# Patient Record
(demographics unavailable — no encounter records)

---

## 2025-01-10 NOTE — ASSESSMENT
[FreeTextEntry1] : - 1/9/2025 38-year-old gentleman with multiple medical issues, with increased levels of stress and anxiety.  From an ENT perspective patient has had concern for a 3-month history of left-sided facial sensation consistent with pins-and-needles in the V2 region.  Examination is unremarkable as a relates to this.  I am recommending consultation with neurology and will help get this expedited.  Would need imaging, orbits face neck if not already completed.    On exam there is evidence of a white coating on the tongue consistent with thrush, could be related to recent antibiotic and steroids given by urgent care.  I am recommending clotrimazole approaches and follow-up in 2 to 3 weeks for repeat evaluation.  In addition on laryngoscopy performed due to symptoms of dysphagia there is evidence of some LPR as well as mild pooling of secretions in the piriform.  I am recommending a modified barium swallow for further evaluation of swallowing.  I recommended continuing with antireflux medications as well.  Of note patient with significant stress and anxiety as it relates to his new medical symptoms.  I do believe that there is a component that this could be conversion.  Strongly recommend consultation with a therapist or psychiatrist for an urgent evaluation as he may benefit from treatment and medication.  Additionally patient has been going to emergency room's and urgent cares without any consolidated plan or medical management.  I am recommending establishing care with a primary care physician to be the point person for his medical management going forward.  - Clotrimazole troches - Low acid diet - Modified barium swallow/esophageal follow-through - Consultation with neurology - Consultation with therapy/psychiatry - Establish care with primary care physician

## 2025-01-10 NOTE — PROCEDURE
Scheduled date of EGD(as of today): 9/24/24  Physician performing EGD: TAI  Location of EGD: [facility]  Instructions reviewed with patient by: PARAG  Clearances: N  
[de-identified] : - Pre-operative Diagnosis:  Throat discomfort Post-operative Diagnosis:  + postcricoid edema, erythema vocal process, mild pooling of secretions in the piriform sinus Anesthesia: Topical - 1 % Lidocaine/Phenylephrine Procedure:  Flexible Laryngoscopy   Procedure Details:   The patient was placed in the sitting position.  After decongestant and anesthesia were applied the laryngoscope was passed.  The nasal cavities, nasopharynx, oropharynx, hypopharynx, and larynx were all examined.  Vocal folds were examined during respiration and phonation.  The following findings were noted:  Findings:   Nose: Septum is midline, turbinates are normal, nasal airways patent, mucosa normal Nasopharynx: Adenoids normal, no masses, eustachian tube normal Oropharynx: Pharyngeal walls symmetric and without lesion. Tonsils/fossae symmetric Hypopharynx: Hypopharynx and pyriform sinuses without lesion. No masses or asymmetry.  + Minimal pooling of secretions. Larynx:  Epiglottis and aryepiglottic folds were sharp and crisp bilaterally.  Bilateral false and true vocal folds normal appearance. Bilateral vocal folds fully mobile and symmetric.  Airway was widely patent. + postcricoid edema, erythema vocal process  Condition: Stable.  Patient tolerated procedure well.  Complications: None 
[de-identified] : - Pre-operative Diagnosis:  Throat discomfort Post-operative Diagnosis:  + postcricoid edema, erythema vocal process, mild pooling of secretions in the piriform sinus Anesthesia: Topical - 1 % Lidocaine/Phenylephrine Procedure:  Flexible Laryngoscopy   Procedure Details:   The patient was placed in the sitting position.  After decongestant and anesthesia were applied the laryngoscope was passed.  The nasal cavities, nasopharynx, oropharynx, hypopharynx, and larynx were all examined.  Vocal folds were examined during respiration and phonation.  The following findings were noted:  Findings:   Nose: Septum is midline, turbinates are normal, nasal airways patent, mucosa normal Nasopharynx: Adenoids normal, no masses, eustachian tube normal Oropharynx: Pharyngeal walls symmetric and without lesion. Tonsils/fossae symmetric Hypopharynx: Hypopharynx and pyriform sinuses without lesion. No masses or asymmetry.  + Minimal pooling of secretions. Larynx:  Epiglottis and aryepiglottic folds were sharp and crisp bilaterally.  Bilateral false and true vocal folds normal appearance. Bilateral vocal folds fully mobile and symmetric.  Airway was widely patent. + postcricoid edema, erythema vocal process  Condition: Stable.  Patient tolerated procedure well.  Complications: None

## 2025-01-10 NOTE — PHYSICAL EXAM
[Normal] : mucosa is normal [Midline] : trachea located in midline position [de-identified] : White coating on tongue

## 2025-01-10 NOTE — HISTORY OF PRESENT ILLNESS
[de-identified] : - 1/9/25 38y male who present with multiple medical issues.  He notes that his symptoms began over 3 months ago in October.  At that time he was having stomach pains as well as severe nausea.  He was having epigastric discomfort.  Subsequently he started to note a "pins-and-needles" sensation in his left face.  It is a tingling sensation but not painful.  He notes that sometimes he can be burning or swollen like getting "hit in the face."  He notes that it might be able to go away when focused elsewhere, but when he thinks about it, it returns.  Feels that it is affecting gums and teeth as well.  Sees dentist regularly.    He went to urgent care and dx with gastritis in October.  Had a CT abdomen which was negative.  Given pantoprazole but nausea and abdominal pain continued.  Subsequently when to [facility] ED and again at CT and US.  Liver lesion was noted.  Had an MRI and found to be benign (seen at [facility]).  Subsequently seen at [facility] emergency room after no changes in symptoms.  Recently at Urgent Care - Augmentin, Medrol daniel.  Around [facility], started to have eye issues.  Seen by ophthalmology and and was cleared with essentially normal exam and given artificial tears.  He does have an unusual medical history.  As a child had elevated CPK which has been looked at several times and is essentially been negative.  He was told that he has Lyme disease after tick bite as a child as well.  There is also this questionable pituitary tumor in the past.  All of which has been moderate and normal.  The patient has been in general good health until 3 to 4 months ago when the most recent symptoms have started.    *Should be noted when I first walked into the room the patient states "I think I am having a panic attack".  Patient is visibly distraught with increased evidence of anxiety.

## 2025-01-10 NOTE — HISTORY OF PRESENT ILLNESS
[de-identified] : - 1/9/25 38y male who present with multiple medical issues.  He notes that his symptoms began over 3 months ago in October.  At that time he was having stomach pains as well as severe nausea.  He was having epigastric discomfort.  Subsequently he started to note a "pins-and-needles" sensation in his left face.  It is a tingling sensation but not painful.  He notes that sometimes he can be burning or swollen like getting "hit in the face."  He notes that it might be able to go away when focused elsewhere, but when he thinks about it, it returns.  Feels that it is affecting gums and teeth as well.  Sees dentist regularly.    He went to urgent care and dx with gastritis in October.  Had a CT abdomen which was negative.  Given pantoprazole but nausea and abdominal pain continued.  Subsequently when to [facility] ED and again at CT and US.  Liver lesion was noted.  Had an MRI and found to be benign (seen at [facility]).  Subsequently seen at [facility] emergency room after no changes in symptoms.  Recently at Urgent Care - Augmentin, Medrol daniel.  Around [facility], started to have eye issues.  Seen by ophthalmology and and was cleared with essentially normal exam and given artificial tears.  He does have an unusual medical history.  As a child had elevated CPK which has been looked at several times and is essentially been negative.  He was told that he has Lyme disease after tick bite as a child as well.  There is also this questionable pituitary tumor in the past.  All of which has been moderate and normal.  The patient has been in general good health until 3 to 4 months ago when the most recent symptoms have started.    *Should be noted when I first walked into the room the patient states "I think I am having a panic attack".  Patient is visibly distraught with increased evidence of anxiety.

## 2025-01-10 NOTE — PHYSICAL EXAM
[Normal] : mucosa is normal [Midline] : trachea located in midline position [de-identified] : White coating on tongue

## 2025-01-10 NOTE — REASON FOR VISIT
[Initial Consultation] : an initial consultation for [FreeTextEntry2] : Left-sided facial discomfort
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
## 2025-01-23 NOTE — PROCEDURE
[de-identified] : - Pre-operative Diagnosis:  Throat discomfort Post-operative Diagnosis:  + postcricoid edema, erythema vocal process, pooling of secretions appears improved compared with previous  Anesthesia: Topical - 1 % Lidocaine/Phenylephrine Procedure:  Flexible Laryngoscopy   Procedure Details:   The patient was placed in the sitting position.  After decongestant and anesthesia were applied the laryngoscope was passed.  The nasal cavities, nasopharynx, oropharynx, hypopharynx, and larynx were all examined.  Vocal folds were examined during respiration and phonation.  The following findings were noted:  Findings:   Nose: Septum is midline, turbinates are normal, nasal airways patent, mucosa normal Nasopharynx: Adenoids normal, no masses, eustachian tube normal Oropharynx: Pharyngeal walls symmetric and without lesion. Tonsils/fossae symmetric Hypopharynx: Hypopharynx and pyriform sinuses without lesion. No masses or asymmetry.  pooling of secretions appears improved compared with previous  Larynx:  Epiglottis and aryepiglottic folds were sharp and crisp bilaterally.  Bilateral false and true vocal folds normal appearance. Bilateral vocal folds fully mobile and symmetric.  Airway was widely patent. + postcricoid edema, erythema vocal process  Condition: Stable.  Patient tolerated procedure well.  Complications: None
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
## 2025-01-23 NOTE — PHYSICAL EXAM
[FreeTextEntry1] : General: this is a pleasant patient in no acute distress   HEENT conjunctiva are normal, no tenderness in head.  CV: normal pulses, regular rate and rhythm, no peripheral edema noted Lungs: breathing is non-labored abd: soft and non-distended   MSK: Full neck ROM. No occipital or temple tenderness. bilateral +jaw click with jaw clench    Mental status: Alert and oriented to person, place and time, normal speech and comprehension   Cranial Nerves: extra-occular movements in tact without nystagmus, normal saccades and smooth pursuit, Face symmetric and facial strength symmetric, facial sensation symmetric to LT pinprick and cold   Motor: normal bulk and tone throughout. no abnormal movements.  Full 5/5 strength uppers and lower extremities proximally and distally   Sensory: in tact and symmetric to vibration, light tough, temperature   Cerebellar: normal finger-nose-finger bilaterally   Reflexes: 2+ in the upper and lower extremities and symmetric.  toes are bilaterally downgoing. Gait: stable
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
## 2025-01-23 NOTE — PHYSICAL EXAM
[Normal] : mucosa is normal [Midline] : trachea located in midline position [de-identified] : white coating of tongue apepars improved

## 2025-01-23 NOTE — REASON FOR VISIT
[Subsequent Evaluation] : a subsequent evaluation for [FreeTextEntry2] : Left-sided facial discomfort

## 2025-01-23 NOTE — CONSULT LETTER
[Dear  ___] : Dear  [unfilled], [Courtesy Letter:] : I had the pleasure of seeing your patient, [unfilled], in my office today. [Please see my note below.] : Please see my note below. [Consult Closing:] : Thank you very much for allowing me to participate in the care of this patient.  If you have any questions, please do not hesitate to contact me. [Sincerely,] : Sincerely, [FreeTextEntry3] : Julian Hunter MD

## 2025-02-12 NOTE — DATA REVIEWED
[de-identified] : 1/27/25 Audio: Hearing WNL AD and hearing WNL sloping to a moderate to severe HF SNHL AS Tymp: Type A tympanograms AU and Complete Audio Evaluation. [de-identified] : MRI Brain & IAC (2/3/25) 1.  No acute intracranial abnormality. No findings suspicious for demyelinating disease. 2.  Negative CPA/IAC bilaterally. 3.  Very mild sinus disease.
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
## 2025-02-12 NOTE — PHYSICAL EXAM
[Normal] : mucosa is normal [Midline] : trachea located in midline position [de-identified] : white coating of tongue apepars improved

## 2025-02-12 NOTE — REASON FOR VISIT
[Subsequent Evaluation] : a subsequent evaluation for [FreeTextEntry2] : Left-sided facial discomfort, left hearing loss, left tinnitus

## 2025-02-27 NOTE — PHYSICAL EXAM
[Respiratory Effort] : normal respiratory effort [Alert] : alert [Oriented to Person] : oriented to person [Oriented to Place] : oriented to place [Calm] : calm [de-identified] : NAD [de-identified] : nml [de-identified] : multiple small lipomas on the abdominal wall - about 3 cm - deep. same on his back.  [de-identified] : see above

## 2025-02-27 NOTE — PLAN
[FreeTextEntry1] : Surgery in OR for excision of at least 6 or so of them.  All for pathology given hx of sarcoma in the family.

## 2025-02-27 NOTE — HISTORY OF PRESENT ILLNESS
[de-identified] : Patient w history of multiple small lipomas excised in the past and he continually gets more.  His last surgery was about 10 yrs ago - though recently he has had some health issues w neurology and ENT due to a numbness and vision change in his right side of his face.  This connected him w [facility] doctors and subsequently w me.  He states his mom has FML and his dad has a hx of sarcoma from a lesion on his back - so he always worries these lipomas could be cancer.  He lives at home w his children (ages 6,4,2) and his wife who is a nurse.  He works as a  but is often in harms way in the line of duty.

## 2025-03-15 NOTE — HISTORY OF PRESENT ILLNESS
[FreeTextEntry1] : NP - moles [de-identified] : Gurjit Chaves 39y/o M presents for check for moles.  Few he is curious about but none new, growing, or changing  PHx of skin cancer: no FHx of skin cancer: Father (?BCC, ?SCC), Aunt (BCC), Grandfather (BCC and SCC) H/x of blistering sunburns: yes H/x of tanning bed use: no Uses sunscreen regularly: no  SH:

## 2025-03-15 NOTE — HISTORY OF PRESENT ILLNESS
[FreeTextEntry1] : NP - moles [de-identified] : Gurjit Chaves 37y/o M presents for check for moles.  Few he is curious about but none new, growing, or changing  PHx of skin cancer: no FHx of skin cancer: Father (?BCC, ?SCC), Aunt (BCC), Grandfather (BCC and SCC) H/x of blistering sunburns: yes H/x of tanning bed use: no Uses sunscreen regularly: no  SH:

## 2025-03-15 NOTE — PHYSICAL EXAM
[FreeTextEntry3] :  AAOx3, NAD, well-appearing / pleasant Total body skin exam performed within normal limits with the exception of:  - Patient deferred examination of genitalia  Left upper arm pink brown papule Right buttock asymmetric brown patch Fairly uniform and regular brown macules and papules on the trunk and extremities

## 2025-03-15 NOTE — ASSESSMENT
[FreeTextEntry1] : # Neoplasm of uncertain behavior Location: L upper arm DDx: R/o scar over nevus vs atypical melanocytic proliferation  Biopsy by shave The risks/benefits/alternatives of skin biopsy were explained to the patient, which include and are not limited to bleeding, infection, scarring or discoloration of skin, and recurrence of lesion. Patient expressed understanding of these risks and provided consent to the procedure. Time out with verification of patient and lesion site was performed. Site was prepped with rubbing alcohol, lidocaine with epinephrine was injected for anesthesia, and biopsy was performed. Specimen sent to path.  Procedure was without complication and well tolerated. Wound care was discussed.  # Neoplasm of uncertain behavior Location: R buttock DDx: CALM vs vs atypical melanocytic proliferation Biopsy by 4 mm punch  The risks/benefits/alternatives of skin biopsy were explained to the patient, which include and are not limited to bleeding, infection, scarring or discoloration of skin, and recurrence of lesion. Patient expressed understanding of these risks and provided consent to the procedure. Time out with verification of patient and lesion site was performed. Site was prepped with rubbing alcohol, lidocaine with epinephrine was injected for anesthesia, and biopsy was performed. Specimen sent to path.  Procedure was without complication and well tolerated. Wound care was discussed.  Dissolvable sutures used  #Lentigines # Multiple melanocytic nevi, all benign appearing on today's exam aside from biopsies above -Sun protection was discussed. The proper use of broad spectrum UVB/UVA sunscreen with SPF 30 or greater was reviewed and the need for re-application after swimming or sweating or 2-3 hours was emphasized. We discussed judicious use of clothing and avoidance of peak periods of sun exposure. I made the patient aware of need for year-round protection. ABCDEs of melanoma reviewed. -Self-skin check also reviewed -Counseled pt to monitor for changes   # Screening for skin cancer -ABCDEs of melanoma, sun safety, and self-skin check reviewed -Counseled pt to notify us of any changing or concerning lesions -RTC 12 months, sooner prn

## 2025-04-01 NOTE — HISTORY OF PRESENT ILLNESS
[FreeTextEntry1] : 38-year-old male here for evaluation of hypothyroidism   He reported that he was diagnosed at age 16.  On Levothyroxine since then.  On LT4 200 mcg daily   Symptoms:  -Low energy  -No hair loss  -Normal bowel movements  -No HP or tremors  -Decreased concentration  -Lost 20 lbs in the past year (intentional)  -No cold or heat intolerance     10/2024- he had severe abdominal pain, found to have a liver lesion.  He had endoscopy had negative biopsies   Got diagnosed with hemochromatosis ( Ferrtin was 1400- Hgb of 18)  Every week phlebotomy   HgA1C of 6.3%  Lost 20 lbs in the past year   Optho: 3-4 months ago ( 12/2024)  Neuro: Occasional in the arms but has herniated discs   Breakfast: Ship  Lunch: Mediterranean bowl ( chicken, rice)  Dinner: Pizza, wrap, burgers  Snacks; None  Juice or soda: Ice tea with starbucks  Desserts: Once every two weeks, cake or cookies

## 2025-04-01 NOTE — PHYSICAL EXAM
[Alert] : alert [Well Nourished] : well nourished [No Acute Distress] : no acute distress [Normal Sclera/Conjunctiva] : normal sclera/conjunctiva [EOMI] : extra ocular movement intact [PERRL] : pupils equal, round and reactive to light [Normal Outer Ear/Nose] : the ears and nose were normal in appearance [Normal Hearing] : hearing was normal [Normal TMs] : both tympanic membranes were normal [No Neck Mass] : no neck mass was observed [Thyroid Not Enlarged] : the thyroid was not enlarged [No Respiratory Distress] : no respiratory distress [Clear to Auscultation] : lungs were clear to auscultation bilaterally [Normal S1, S2] : normal S1 and S2 [Normal Rate] : heart rate was normal [Regular Rhythm] : with a regular rhythm [Normal Bowel Sounds] : normal bowel sounds [Not Tender] : non-tender [Soft] : abdomen soft [Normal Gait] : normal gait [No Clubbing, Cyanosis] : no clubbing  or cyanosis of the fingernails [No Joint Swelling] : no joint swelling seen [No Rash] : no rash [No Skin Lesions] : no skin lesions [No Motor Deficits] : the motor exam was normal [No Tremors] : no tremors [Oriented x3] : oriented to person, place, and time [Normal Affect] : the affect was normal [Normal Insight/Judgement] : insight and judgment were intact

## 2025-04-01 NOTE — ASSESSMENT
[FreeTextEntry1] : 38 year old male here for evaluation of hypothyroidism and prediabetes.  Hypothyroidism:  -check TFTs  -Continue Levothyroxine 200 mcg daily  -Clinically euthyroid   Prediabetes:  -Check HgA1C, lipid panel and BMP  -Extensive discussion about weight loss methods  -Discussed calorie restriction    Follow up in 3 moths

## 2025-04-01 NOTE — REVIEW OF SYSTEMS
[Fatigue] : no fatigue [Decreased Appetite] : appetite not decreased [Recent Weight Loss (___ Lbs)] : no recent weight loss [Visual Field Defect] : no visual field defect [Dry Eyes] : no dryness [Dysphagia] : no dysphagia [Dysphonia] : no dysphonia [Chest Pain] : no chest pain [Slow Heart Rate] : heart rate is not slow [Palpitations] : no palpitations [Fast Heart Rate] : heart rate is not fast [Shortness Of Breath] : no shortness of breath [Cough] : no cough [Nausea] : no nausea [Constipation] : no constipation [Vomiting] : no vomiting [Polyuria] : no polyuria [Hesistancy] : no hesitancy [Joint Pain] : no joint pain [Muscle Weakness] : no muscle weakness [Acanthosis] : no acanthosis  [Acne] : no acne [Headaches] : no headaches [Tremors] : no tremors [Depression] : no depression [Polydipsia] : no polydipsia [Cold Intolerance] : no cold intolerance [Easy Bleeding] : no ~M tendency for easy bleeding [Easy Bruising] : no tendency for easy bruising

## 2025-04-18 NOTE — HISTORY OF PRESENT ILLNESS
[de-identified] : 38 yr old s/p removal of mulitple angiolipomas on his anterior abdominal wall.  Doing well.  No issues.  he did have some hot grease splatter on his shirt near the incisions recently while searing a steak.  He got a mild burn but it is healing well.

## 2025-04-18 NOTE — ASSESSMENT
[FreeTextEntry1] : BAcitracin or Neosporin to burn sites and the RUQ incision.  Follow up as needed for the remainder of the lipomas on your flank and back.

## 2025-04-18 NOTE — PHYSICAL EXAM
[Calm] : calm [de-identified] : NAD [de-identified] : all incisions healed. RUQ incisions wtih some erythema

## 2025-05-13 NOTE — PHYSICAL EXAM
[de-identified] : Constitutional: Well developed, well nourished, able to communicate Neuro: Normal sensation, No focal deficits Skin: Intact CV: Peripheral vascular exam grossly normal Pulm: No signs of respiratory distress Psych: Oriented, normal mood and affect  L knee: - No obvious deformity or swelling - No pain with palpation of medial or lateral joint line. - ROM from 135 degrees of flexion to 0 degrees extension. - 5/5 strength with knee flexion and extension - Distally neurovascularly intact.     R knee: - No obvious deformity or swelling - No pain with palpation of medial or lateral joint line. - ROM from 135 degrees of flexion to 0 degrees extension. - 5/5 strength with knee flexion and extension - Stable varus, valgus testing -Negative Homans - Pain with gastroc greater than soleus stretching - Distally neurovascularly intact. [de-identified] : 3 views XR right knee without fracture, dislocation, significant degenerative change.  Small amount of patellofemoral osteophyte changes with lateral patellar tilt

## 2025-05-13 NOTE — ASSESSMENT
[FreeTextEntry1] : Right knee pain with fullness sensation in posterior popliteal fossa.  Ultrasound for DVT at [facility] showing moderate size Baker's cyst.  Ultrasound evaluation in clinic today with no evidence of ongoing Baker's cyst.  X-rays obtained showing very mild patellofemoral degenerative changes.  Exam more specific for pain related to gastroc activation - Recommend home exercise plan versus physical therapy for his gastrocnemius - He can use compression sleeve if he feels like this helps - Follow-up in 4 weeks.  Can reevaluate if Baker's cyst is present at this time.  Discussed that they can come and go.  Could also consider MRI of the right knee if persistent tendinopathy pain

## 2025-05-13 NOTE — HISTORY OF PRESENT ILLNESS
[de-identified] : 5/13/2025: Patient is a 38-year-old male presenting for evaluation of right posterior knee/calf pain that been ongoing for the last 3 weeks.  He states pain started after a cramping sensation.  He gets cramping sensation in both of his calves, but this 1 in the right was worse.  He has pain at rest and with physical activity.  He has tried Advil without improvement.  He went to the emergency department at [facility] on 5/6/2025 where he had ultrasound Doppler for evaluation of substernal chest pain.  Ultrasound was negative for DVT, but did show a substantial 4.6 x 2.7 x 4.4 cm Baker's cyst.  He does state that he has history of knee injury in the past which was not evaluated

## 2025-05-13 NOTE — REASON FOR VISIT
Remote device check.  Please see link for full device report.  Patient was informed of results and next follow up via mail.     [Initial Visit] : an initial visit for

## 2025-07-10 NOTE — HISTORY OF PRESENT ILLNESS
[FreeTextEntry1] : DARRELL WILBURN is a 39 year who returns to follow up for paresthesias  last visit was 1/23/2025  "Facial numbness (782.0) (R20.0) i? i?i? 38y male former chewing tobacco use and remote history of migraine presents for     chronic L cheek paresthesias and L gum numbness after GI illness 3-4 months ago.     Exam with +jaw click otherwise unremarkable with intact sensation all trigeminal     distribution to LT pinprick and temp. Given his history of migraines in the past, and     blurry vision in left eye it is most likely that his left cheek sensation is a     prolonged aura (without headache) onset after GI illness and subsequent endoscopy.     Symptoms are not consistent with trigeminal neuralgia. Given he had CTH/CTA recently     normal and has a normal exam low suspicion for inflammatory or neoplastic etiology but     due to his tobacco history and person history of widespread lipoma and angioma will     pursue MR Brain w/w/o Abdelrahman to rule out structural cause. If ruled out will provide     high dose PO steroid to abort migraine/aura."  since last visit had MRI that was normal. I sent 10 days prednisone and suggested starting topiramate. he felt worse with steroids due to stomach pain and headache. then he felt topiramate was making him worse but was taking them in the context of a new NPA. by 3/3/2025 reported a positional factor to the headaches and lightheadedness. also found to have hemochromatosis in March and prescribed phlebotomy. then by june was having more cramping and also had uric acid found to be very high. also intermittent electrical pains going into legs. facial symptoms to continue and overall have been worse than before in the last few weeks. sometimes feels like brain is moving. jaws clicking less. has no energy at all  , b12, ESR, CRP, SPEP wnl  needed phlebotomy for hemochromatosis. he had been on b12 shot in May-June because level was 300. 4 weekly 1000mcg shots done. didn't start pill. last week b12 was 500 after I sent it and got a number in 600s.  stopped gluten 2 weeks ago and lost 15lbs. also hasn't eaten in 3 days.  extremely nauseous.  he has had CKs up to 1500s early in life, recent levels lower than usual

## 2025-07-10 NOTE — CONSULT LETTER
[Dear  ___] : Dear  [unfilled], [Courtesy Letter:] : I had the pleasure of seeing your patient, [unfilled], in my office today. [Please see my note below.] : Please see my note below. [Consult Closing:] : Thank you very much for allowing me to participate in the care of this patient.  If you have any questions, please do not hesitate to contact me. [Sincerely,] : Sincerely, [FreeTextEntry3] : Julian Hunter MD [DrJohn  ___] : Dr. PARADA

## 2025-07-10 NOTE — PHYSICAL EXAM
[FreeTextEntry1] : General: this is a pleasant patient in no acute distress   HEENT conjunctiva are normal, no tenderness in head   CV: normal pulses, regular rate and rhythm, no peripheral edema noted   Lungs: breathing is non-labored   abd: soft and non-distended   MSK: SLR: KAROLINA: range of motion: tinnels: spurling: Occipital nerve tenderness:   Mental status: Alert and oriented to person, place and time, normal speech and comprehension   Cranial Nerves: extra-occular movements in tact without nystagmus, normal saccades and smooth pursuit, Face symmetric and facial strength symmetric, facial sensation symmetric,   Motor: normal bulk and tone throughout. no abnormal movements.  Full 5/5 strength uppers and lower extremities proximally and distally   Sensory: in tact and symmetric to vibration, light tough, temperature   Cerebellar: normal finger-nose-finger bilaterally   Reflexes: 2+ in the upper and 3+ knees, 2+ and symmetric.  toes are bilaterally downgoing.   Gait: stable, able to tip toe heel and tandem   Stances: Romberg: normal

## 2025-07-10 NOTE — DATA REVIEWED
[de-identified] : iron sat 29, iron 87, TIBC 297 CK by me 8/8/2025 was 311, this week CK by another  aldolase wnl, myositis panel neg

## 2025-07-29 NOTE — PROCEDURE
[FreeTextEntry1] : Pt has hx of multiple lipomas and is s/p removal of abdominal lipomas in the OR recently.  More on his arms that he desires excision for and this is possible in the office.   Consent signed.  Areas marked.  Betadyne used, 1% lidocaine plain injected. 15 blade scalpel used to incise over the areas.  2 areas on the upper right arm, 1 on the lower left arm.  Specimens excised and placed in formalin.  All specimens were less than 3 cm in width and length.  Hemostasis excellent.  Skin closed w subcuticular 4-0 monocryl.  Steristrips applied and dsd.   Pt tolerated very well.

## 2025-07-29 NOTE — ASSESSMENT
[FreeTextEntry1] : Follow up as needed and will book additional chest and abdominal wall lipomas for September. In OR.